# Patient Record
(demographics unavailable — no encounter records)

---

## 2025-01-15 NOTE — HISTORY OF PRESENT ILLNESS
[de-identified] : The upper endoscopy performed at the [facility] [place] GI endoscopy suite on February 16, 2023 revealed mild diffuse bile gastritis. The gastric pathology performed on February 16, 2023 revealed esophageal mucosa with fragments of unremarkable squamous esophageal epithelium with a PASF stain that is negative for fungal microorganisms (esophagus), gastric body mucosa with gastric oxyntic type mucosa with no significant histopathological findings that was negative for Helicobacter pylori (body of the stomach), gastric antral mucosa with chronic inactive gastritis with foci of incomplete intestinal metaplasia that was negative for Helicobacter pylori (antrum) and duodenal mucosa with preserved villous architecture with no parasites or increased intraepithelial lymphocytes identified (duodenum).\par  \par  The upper endoscopy performed at the office on September 13, 2016 revealed mild diffuse gastritis.  The pathology revealed unremarkable squamous mucosa with no evidence of reflux or eosinophilic esophagitis, gastric antral mucosa with chemical/reactive gastropathy with chronic inflammation and intestinal metaplasia that was negative for Helicobacter pylori and unremarkable gastric oxyntic mucosa with no evidence of intestinal metaplasia that was negative for Helicobacter pylori. \par  The upper endoscopy performed on September 17, 2008 revealed mild diffuse bile gastritis. The pathology revealed gastric antral and body mucosa with chronic active gastritis that was positive for Helicobacter pylori and unremarkable duodenal small bowel mucosa.\par   [FreeTextEntry1] : The colonoscopy to the terminal ileum performed at the [facility] GI endoscopy suite on February 16, 2023 revealed a normal colon and small internal hemorrhoids. .Random biopsies were also taken of the terminal ileum and cecum to assess for ileitis and microscopic colitis. There were no polyps, masses, diverticulosis, AVMs or colitis noted.  The colonic mucosa performed on February 16, 2023 revealed colonic mucosa with no significant histopathological findings (cecum) and small intestinal mucosa with preserved villous architecture with no parasites or increased intraepithelial lymphocytes identified (terminal ileum).  \par  \par  The colonoscopy to the terminal ileum performed on September 8, 2009 revealed a normal colon and small internal hemorrhoids. There were no polyps, masses, diverticulosis, AVMs or colitis noted. \par   [de-identified] : The abdominal ultrasound performed on June 25, 2024 revealed mild hepatic steatosis.    The  abdominal ultrasound performed on August 2016 revealed no hernia identified, fatty infiltration of the liver with a prominent caudate lobe, cholecystectomy with no biliary dilatation.

## 2025-01-15 NOTE — ASSESSMENT
[FreeTextEntry1] : Dyspepsia: The patient complains of dyspeptic symptoms.  The patient was advised to continue to abide by an anti-gas (low FOD-MAP) diet.  The patient was previously given a pamphlet for anti-gas (low FOD-MAP).  The patient and I reviewed the anti-gas (low FOD-MAP)diet at length again. The patient is to continue on a trial of Simethicone one tablet 4 times a day p.r.n. abdominal pain and gas. GERD: The patient was advised to avoid late-night meals and dietary indiscretions.  The patient was advised to avoid fried and fatty foods.  The patient was advised to abide by an anti-GERD diet. The patient was given a pamphlet for anti-GERD.  The patient and I reviewed the anti-GERD diet at length. I recommend a trial of famotidine 40 mg twice a day x 3 months for the symptoms. Gastritis: The patient has a history of gastritis noted on prior upper endoscopy. The patient is to avoid nonsteroidal anti-inflammatory drugs and aspirin. I recommend a trial of Famotidine 40 mg twice a day for 3 months for the symptoms. Intestinal Metaplasia of the Stomach: The patient was found to have intestinal metaplasia of the stomach on prior upper endoscopy. The findings of intestinal metaplasia of the stomach have an increased risk of gastric cancer. The biopsies did not reveal dysplasia. I recommend a repeat upper endoscopy with mapping in 1 year to reassess for intestinal metaplasia and dysplasia unless symptomatic. The patient is aware of the increased risk of intestinal metaplasia and progression to stomach cancer. The patient agrees to follow-up. Internal Hemorrhoids: The patient is to consider starting a trial of Anusol H. C. suppositories one per rectum nightly and Anusol HC2.5% cream apply to affected area twice a day p.r.n. hemorrhoidal bleeding or pain. I also recommend a trial of Calmoseptine cream apply to affected area twice a day for hemorrhoidal discomfort. I recommend Tucks pads for the hemorrhoids. I recommend Sitz baths twice a day for the hemorrhoids. I recommend avoid wearing tight underwear and use boxers. I recommend avoid touching the perineal area. The patient agreed to followup. I recommend a repeat colonoscopy in 3 years to reassess for colonic polyps unless symptomatic. The patient agreed and will follow up for the procedure. History of GI Malignancy: The patient admits to a family history of GI problems. The patient's maternal grandfather  of stomach cancer and father had a history of colonic polyps. Family History of Colonic Polyps: The patient has a family history of colonic polyps. I recommend a repeat colonoscopy in 3 years to reassess for colonic polyps unless symptomatic. The patient agreed and will follow up for the procedure. Fatty Liver: The patient had an imaging study suggestive of fatty infiltration of the liver. The patient denies any jaundice or pruritus. The patient denies any alcohol use. The patient denies taking large doses of nonsteroidal anti-inflammatory drugs or acetaminophen. The findings are suggestive of fatty liver. The patient and I had a long discussion regarding the risks of fatty liver and possible progression to cirrhosis. The patient was told of the possible increased risk of developing liver failure, cirrhosis, ascites, GI bleeding secondary to varices, hepatic encephalopathy, bleeding tendencies and liver cancer. The patient was told of the importance of follow-up. The patient was advised to follow up every 6 months for blood work and imaging studies. The patient agreed and will follow up. The patient was advised to lose weight and exercise. The patient was advised to abide by a Mediterranean diet. I recommend a trial of vitamin E supplementation for the fatty liver. If the liver enzymes remain elevated, the patient may require a trial of Pioglitazone for the fatty liver. I recommend avoid alcohol and hepato-toxic agents. The patient was also advised to avoid NSAIDs, Acetaminophen and any other hepatotoxic drugs. The patient was also advised not to share needles, razors, scissors, nail clippers, etc.. The patient is to continue close follow-up in our office for blood work and exams. If the liver enzymes remain elevated, the patient may require a CT guided liver biopsy to assess the liver parenchyma for possible treatment. We had a long discussion regarding the risks and benefits of the procedure. The patient was told of the risks of bleeding, perforation, infections, emergency surgery and missing lesions. The patient agreed and will follow-up to reassess the symptoms.  Follow-up: The patient is to follow-up in the office in 6 months to reassess the symptoms. The patient was told to call the office if any further problems.

## 2025-06-18 NOTE — ASSESSMENT
[FreeTextEntry1] : Dyspepsia: The patient complains of dyspeptic symptoms.  The patient was advised to continue to abide by an anti-gas (low FOD-MAP) diet.  The patient was previously given a pamphlet for anti-gas (low FOD-MAP).  The patient and I reviewed the anti-gas (low FOD-MAP)diet at length again. The patient is to continue on a trial of Simethicone one tablet 4 times a day p.r.n. abdominal pain and gas. Diarrhea: The patient complains of diarrhea.  I recommend a low residue diet. The patient is to avoid fiber supplementation. The patient is to consider starting a trial of a probiotic such as Align once a day.    If the symptoms persist, the patient may require sending stool studies for C+S, O+P x3, and C. difficile to assess for an infectious etiology of the diarrhea.  The symptoms are worse after meals.  The patient has a history of cholecystectomy.  I recommend a trial of cholestyramine one packet once a day for possible bile induced diarrhea. If the symptoms persist, the patient may require a colonoscopy to assess for colitis versus other causes.  The patient was told of the risks and benefits of the procedure.  The patient was told of the risks of perforation, emergency surgery, bleeding, infections and missed lesions.  The patient agreed and will follow-up to reassess the symptoms.   Gastritis: The patient has a history of gastritis noted on prior upper endoscopy. The patient is to avoid nonsteroidal anti-inflammatory drugs and aspirin. I recommend a trial of Famotidine 40 mg twice a day for 3 months for the symptoms. Intestinal Metaplasia of the Stomach: The patient was found to have intestinal metaplasia of the stomach on prior upper endoscopy. The findings of intestinal metaplasia of the stomach have an increased risk of gastric cancer. The biopsies did not reveal dysplasia. I recommend a repeat upper endoscopy with mapping in 1 year to reassess for intestinal metaplasia and dysplasia unless symptomatic. The patient is aware of the increased risk of intestinal metaplasia and progression to stomach cancer. The patient agrees to follow-up. Internal Hemorrhoids: The patient is to consider starting a trial of Anusol H. C. suppositories one per rectum nightly and Anusol HC2.5% cream apply to affected area twice a day p.r.n. hemorrhoidal bleeding or pain. I also recommend a trial of Calmoseptine cream apply to affected area twice a day for hemorrhoidal discomfort. I recommend Tucks pads for the hemorrhoids. I recommend Sitz baths twice a day for the hemorrhoids. I recommend avoid wearing tight underwear and use boxers. I recommend avoid touching the perineal area. The patient agreed to followup. I recommend a repeat colonoscopy in 3 years to reassess for colonic polyps unless symptomatic. The patient agreed and will follow up for the procedure. History of GI Malignancy: The patient admits to a family history of GI problems. The patient's maternal grandfather  of stomach cancer and father had a history of colonic polyps. Family History of Colonic Polyps: The patient has a family history of colonic polyps. I recommend a repeat colonoscopy in 3 years to reassess for colonic polyps unless symptomatic. The patient agreed and will follow up for the procedure. Fatty Liver: The patient had an imaging study suggestive of fatty infiltration of the liver. The patient denies any jaundice or pruritus. The patient denies any alcohol use. The patient denies taking large doses of nonsteroidal anti-inflammatory drugs or acetaminophen. The findings are suggestive of fatty liver. The patient and I had a long discussion regarding the risks of fatty liver and possible progression to cirrhosis. The patient was told of the possible increased risk of developing liver failure, cirrhosis, ascites, GI bleeding secondary to varices, hepatic encephalopathy, bleeding tendencies and liver cancer. The patient was told of the importance of follow-up. The patient was advised to follow up every 6 months for blood work and imaging studies. The patient agreed and will follow up. The patient was advised to lose weight and exercise. The patient was advised to abide by a Mediterranean diet. I recommend a trial of vitamin E supplementation for the fatty liver. If the liver enzymes remain elevated, the patient may require a trial of Pioglitazone for the fatty liver. I recommend avoid alcohol and hepato-toxic agents. The patient was also advised to avoid NSAIDs, Acetaminophen and any other hepatotoxic drugs. The patient was also advised not to share needles, razors, scissors, nail clippers, etc.. The patient is to continue close follow-up in our office for blood work and exams. If the liver enzymes remain elevated, the patient may require a CT guided liver biopsy to assess the liver parenchyma for possible treatment. We had a long discussion regarding the risks and benefits of the procedure. The patient was told of the risks of bleeding, perforation, infections, emergency surgery and missing lesions. The patient agreed and will follow-up to reassess the symptoms. Blood Work: I recommend blood work to assess the liver. I recommend a CBC, SMA 24, amylase, lipase, ESR, TFTs, VANESSA, rheumatoid factor, celiac sprue panel, IgA, profile for hepatitis A, B, C. ,AFP, alpha 1 anti-trypsin  antibody, ceruloplasmin level, iron, TIBC, ferritin level, AMA, anti smooth muscle antibody and PT/INR/PTT.  I also recommend obtaining the recent blood work performed by the patient's PMD. Imaging Study: I recommend an imaging study to assess the symptoms. I recommend a liver fibroscan to assess the liver parenchyma and for liver lesions. Follow-up: The patient is to follow-up in the office in 6 months to reassess the symptoms. The patient was told to call the office if any further problems.

## 2025-06-18 NOTE — HISTORY OF PRESENT ILLNESS
[de-identified] : The upper endoscopy performed at the [facility] GI endoscopy suite on February 16, 2023 revealed mild diffuse bile gastritis. The gastric pathology performed on February 16, 2023 revealed esophageal mucosa with fragments of unremarkable squamous esophageal epithelium with a PASF stain that is negative for fungal microorganisms (esophagus), gastric body mucosa with gastric oxyntic type mucosa with no significant histopathological findings that was negative for Helicobacter pylori (body of the stomach), gastric antral mucosa with chronic inactive gastritis with foci of incomplete intestinal metaplasia that was negative for Helicobacter pylori (antrum) and duodenal mucosa with preserved villous architecture with no parasites or increased intraepithelial lymphocytes identified (duodenum).\par  \par  The upper endoscopy performed at the office on September 13, 2016 revealed mild diffuse gastritis.  The pathology revealed unremarkable squamous mucosa with no evidence of reflux or eosinophilic esophagitis, gastric antral mucosa with chemical/reactive gastropathy with chronic inflammation and intestinal metaplasia that was negative for Helicobacter pylori and unremarkable gastric oxyntic mucosa with no evidence of intestinal metaplasia that was negative for Helicobacter pylori. \par  The upper endoscopy performed on September 17, 2008 revealed mild diffuse bile gastritis. The pathology revealed gastric antral and body mucosa with chronic active gastritis that was positive for Helicobacter pylori and unremarkable duodenal small bowel mucosa.\par   [FreeTextEntry1] : The colonoscopy to the terminal [place]um [place] [facility] [place] on February 16, 2023 revealed a normal colon and small internal hemorrhoids. .Random biopsies were also taken of the terminal ileum and cecum to assess for ileitis and microscopic colitis. There were no polyps, masses, diverticulosis, AVMs or colitis noted.  The colonic mucosa performed on February 16, 2023 revealed colonic mucosa with no significant histopathological findings (cecum) and small intestinal mucosa with preserved villous architecture with no parasites or increased intraepithelial lymphocytes identified (terminal ileum).  \par  \par  The colonoscopy to the terminal ileum performed on September 8, 2009 revealed a normal colon and small internal hemorrhoids. There were no polyps, masses, diverticulosis, AVMs or colitis noted. \par   [de-identified] : The abdominal ultrasound performed on June 25, 2024 revealed mild hepatic steatosis.    The  abdominal ultrasound performed on August 2016 revealed no hernia identified, fatty infiltration of the liver with a prominent caudate lobe, cholecystectomy with no biliary dilatation.